# Patient Record
Sex: MALE | Race: WHITE | URBAN - METROPOLITAN AREA
[De-identification: names, ages, dates, MRNs, and addresses within clinical notes are randomized per-mention and may not be internally consistent; named-entity substitution may affect disease eponyms.]

---

## 2018-03-16 PROBLEM — I10 ESSENTIAL HYPERTENSION: Status: ACTIVE | Noted: 2018-03-16

## 2018-03-16 PROBLEM — R35.1 NOCTURIA: Status: ACTIVE | Noted: 2018-03-16

## 2018-03-16 PROCEDURE — 81003 URINALYSIS AUTO W/O SCOPE: CPT | Performed by: NURSE PRACTITIONER

## 2018-03-16 PROCEDURE — 86803 HEPATITIS C AB TEST: CPT | Performed by: NURSE PRACTITIONER

## 2022-11-30 ENCOUNTER — ORDER TRANSCRIPTION (OUTPATIENT)
Dept: ADMINISTRATIVE | Facility: HOSPITAL | Age: 67
End: 2022-11-30

## 2022-11-30 DIAGNOSIS — Z13.6 SCREENING FOR HEART DISEASE: Primary | ICD-10-CM

## 2023-01-18 ENCOUNTER — HOSPITAL ENCOUNTER (OUTPATIENT)
Dept: CT IMAGING | Facility: HOSPITAL | Age: 68
Discharge: HOME | End: 2023-01-18
Attending: NURSE PRACTITIONER

## 2023-01-18 DIAGNOSIS — Z13.6 SCREENING FOR HEART DISEASE: ICD-10-CM

## 2023-01-19 ENCOUNTER — TELEPHONE (OUTPATIENT)
Dept: OTHER | Facility: HOSPITAL | Age: 68
End: 2023-01-19

## 2023-01-20 NOTE — PROGRESS NOTES
ate of Service 1/19/2023    Woodstock Bigger  Date of Birth 7/6/1955    Patient Age: 75year old    PCP: LISA Fuller [address] [place] [address]    Consult Type telephone consult   Type Scan/Screening: Heart Scan  Preliminary Heart Scan Score: 2.09            Lipid Profile  Cholesterol: 293, done on 12/15/2021. HDL Cholesterol: 52, done on 12/15/2021. LDL Cholesterol: 196, done on 12/15/2021. TriGlycerides 224, done on 12/15/2021. Nurse Review  Risk factor information and results reviewed with Nurse: Yes    Recommended Follow Up:  Consult your physician regarding[de-identified] Final Heart Scan Report    Free PV Screening offered to patient. (carotid abd us offered to pt and information sent to scheduling)      Recommendations for Change:  Nutrition Changes: Low Saturated Fat;Low Fat Dairy; Low Salt Eating; Increase Fiber  Cholesterol Modification (goal of therapy depends upon your risk): Decrease LDL (Lousy/Bad) Ideal <100;Decrease Triglycerides (Ugly) Normal <150;Decrease Total Normal <200     Smoking Cessation:  (could rate readiness to quit but was considering it)        Repeat Heart Scan: 3 Years if Calcium Score is > 0. 0; Discuss with your Physician          [facility] Recommended Resources:  Recommended Resources: Upcoming Classes, Medical Services and Health Library [email or web address]. Sabiha Maldonado, RN        Please Contact the Nurse Heart Line with any Questions or Concerns [phone].

## 2023-04-17 ENCOUNTER — HOSPITAL ENCOUNTER (OUTPATIENT)
Dept: ULTRASOUND IMAGING | Facility: HOSPITAL | Age: 68
End: 2023-04-17
Attending: NURSE PRACTITIONER

## 2023-04-17 VITALS — HEIGHT: 70 IN | BODY MASS INDEX: 27.06 KG/M2 | WEIGHT: 189 LBS

## 2023-04-17 DIAGNOSIS — Z13.9 ENCOUNTER FOR SCREENING: ICD-10-CM

## 2023-04-17 NOTE — ADDENDUM NOTE
Encounter addended by: Yelitza Manriquez RN on: 4/17/2023 3:46 PM   Actions taken: Clinical Note Signed